# Patient Record
Sex: FEMALE | Race: WHITE
[De-identification: names, ages, dates, MRNs, and addresses within clinical notes are randomized per-mention and may not be internally consistent; named-entity substitution may affect disease eponyms.]

---

## 2020-04-23 ENCOUNTER — HOSPITAL ENCOUNTER (OUTPATIENT)
Dept: HOSPITAL 97 - OR | Age: 30
Discharge: HOME | End: 2020-04-23
Attending: SURGERY
Payer: SELF-PAY

## 2020-04-23 VITALS — SYSTOLIC BLOOD PRESSURE: 103 MMHG | DIASTOLIC BLOOD PRESSURE: 87 MMHG

## 2020-04-23 VITALS — TEMPERATURE: 97.8 F

## 2020-04-23 VITALS — OXYGEN SATURATION: 100 %

## 2020-04-23 DIAGNOSIS — L05.01: Primary | ICD-10-CM

## 2020-04-23 DIAGNOSIS — Z88.0: ICD-10-CM

## 2020-04-23 DIAGNOSIS — L03.317: ICD-10-CM

## 2020-04-23 DIAGNOSIS — F17.210: ICD-10-CM

## 2020-04-23 LAB
BUN BLD-MCNC: 11 MG/DL (ref 7–18)
GLUCOSE SERPLBLD-MCNC: 89 MG/DL (ref 74–106)
HCT VFR BLD CALC: 34.8 % (ref 36–45)
LYMPHOCYTES # SPEC AUTO: 1.3 K/UL (ref 0.7–4.9)
PMV BLD: 7.9 FL (ref 7.6–11.3)
POTASSIUM SERPL-SCNC: 4.5 MMOL/L (ref 3.5–5.1)
RBC # BLD: 4.08 M/UL (ref 3.86–4.86)

## 2020-04-23 PROCEDURE — 80048 BASIC METABOLIC PNL TOTAL CA: CPT

## 2020-04-23 PROCEDURE — 36415 COLL VENOUS BLD VENIPUNCTURE: CPT

## 2020-04-23 PROCEDURE — 88304 TISSUE EXAM BY PATHOLOGIST: CPT

## 2020-04-23 PROCEDURE — 71046 X-RAY EXAM CHEST 2 VIEWS: CPT

## 2020-04-23 PROCEDURE — 84703 CHORIONIC GONADOTROPIN ASSAY: CPT

## 2020-04-23 PROCEDURE — 87070 CULTURE OTHR SPECIMN AEROBIC: CPT

## 2020-04-23 PROCEDURE — 87205 SMEAR GRAM STAIN: CPT

## 2020-04-23 PROCEDURE — 85025 COMPLETE CBC W/AUTO DIFF WBC: CPT

## 2020-04-23 PROCEDURE — 87075 CULTR BACTERIA EXCEPT BLOOD: CPT

## 2020-04-23 PROCEDURE — 0JB90ZZ EXCISION OF BUTTOCK SUBCUTANEOUS TISSUE AND FASCIA, OPEN APPROACH: ICD-10-PCS

## 2020-04-23 PROCEDURE — 11771 EXC PILONIDAL CYST XTNSV: CPT

## 2020-04-23 NOTE — RAD REPORT
EXAM DESCRIPTION:  RAD - Chest Pa And Lat (2 Views) - 4/23/2020 10:39 am

 

CLINICAL HISTORY:  PRE OP FOR SURGERY

Chest pain.

 

COMPARISON:  No comparisons

 

FINDINGS:  The lungs are clear. The heart is normal in size. No displaced fractures.

 

IMPRESSION:  No acute or concerning finding suspected.

## 2020-04-23 NOTE — P.BOP
Preoperative diagnosis: infected tender pilonidal cyst


Postoperative diagnosis: same


Primary procedure: WIde excision of infected pilonidal cyst with abscess 

drainage 7 x 6 x 3 cm


Estimated blood loss: <10cc


Specimen: pus and cyst


Findings: see dictation


Anesthesia: General


Complications: None


Drain(s): Other (NS packing)


Transferred to: Recovery Room


Condition: Good

## 2020-04-24 NOTE — DS
Date of Discharge:  04/23/2020



Diagnosis:  Infected pilonidal cyst with abscess.



Procedure:  Wide excision of infected pilonidal cyst with abscess drainage.



Disposition:  Home.



Activity:  As tolerated, no heavy lifting.



Discharge Instructions:  Follow up in my office in 1 week.  Call for appointment [phone].  The patie
nt is already taking Bactrim DS p.o. b.i.d. that the primary doctor gave and we are going to continue
 with the same. We are going to follow up on the cultures.  Patient claims allergy to hydrocodone, so
 we are going to give her Ultracet q.4 hours p.r.n. pain and a prescription also for saline.  She has
 a friend, who is in healthcare, who is going to be doing her dressing changes.  Follow up in my offi
ce in 1 week.





ZIA/HONG

DD:  04/23/2020 16:41:57Voice ID:  [id number]

DT:  04/24/2020 01:58:15Report ID:  [id number]

## 2020-04-24 NOTE — OP
Date of Procedure:  04/23/2020



Surgeon:  Jake Richard MD



Preoperative Diagnosis:  Infected very tender pilonidal cyst with cellulitis and abscess.



Postoperative Diagnosis:  Infected very tender pilonidal cyst with cellulitis and abscess.



Procedures:  Wide excision of infected pilonidal cyst with abscess drainage 7 x 6 x 3 cm.



Findings:  Pus pocket present in the pilonidal cyst goes all the way down to the coccyx bone.



Anesthesia:  General plus local.



Packing:  Normal saline packing wet-to-dry.



Indications:  This is the case of a female who comes to us with very tender piloidal cyst,  _________
_ sent home on antibiotics, __________ getting even worse, redness covering now both areas of the but
tocks including sacral region.  It is very tender, she wants that done under anesthesia.  She cannot 
take anymore so she was booked emergently for wide excision of pilonidal cyst with abscess, drainage 
with benefits, alternatives, and risks including, but not limited to infection, bleeding, damage to a
djacent structures, anesthesia complication, recurrence, MI, and even death.  She also understands th
is may not relieve any symptoms, she might need more than one surgical intervention.  She understood.
  Also, she will require wound care over the next several months.  She signed a consent.



Description Of Procedure:  Patient was brought to the operating room, placed in supine position, anes
thesia was given without complication.  Patient was placed in prone position with proper protection. 
 A time-out was called.  Sacral area was prepped and draped in usual sterile fashion.  We used an Ang
iocath with methylene blue and placed that to one of the orifice __________ cyst.  Then after that, w
e proceeded then to make a wide excision of the area, removed the pus and removed the __________ with
 the wall of the cyst too.  This goes all the way down to bone.  __________ the pus was excised, cult
ures were done.  The area was profusely irrigated.  Hemostasis obtained and the area was packed with 
wet-to-dry dressing.  Patient tolerated the procedure well.  Patient was sent to the recovery room in
 stable condition.





ZIA/HONG

DD:  04/23/2020 16:41:57Voice ID:  [id number]

DT:  04/24/2020 01:54:57Report ID:  [id number]